# Patient Record
Sex: FEMALE
[De-identification: names, ages, dates, MRNs, and addresses within clinical notes are randomized per-mention and may not be internally consistent; named-entity substitution may affect disease eponyms.]

---

## 2023-07-17 ENCOUNTER — NURSE TRIAGE (OUTPATIENT)
Dept: OTHER | Facility: CLINIC | Age: 28
End: 2023-07-17

## 2023-07-18 NOTE — TELEPHONE ENCOUNTER
Location of patient: [place]     Subjective: Caller states \"Left side lump on breast\"     Current Symptoms: Movable lump on breast, red, painful, swollen    Associated Symptoms: irritability , fussiness    Pain Severity: 10/10; burning; constant    Temperature: Unknown temperatures now     What has been tried: Nothing    LMP:  07/15/2023  Pregnant: No    Recommended disposition: Go to ED Now    Care advice provided, patient verbalizes understanding; denies any other questions or concerns. Outcome: Caller agrees to go to local ED.       This triage is a result of a call to the Nurse Disrupted Nurse Line  Reason for Disposition   [1] Breast looks infected (spreading redness, feels hot or painful to touch) AND [2] fever    Protocols used: Breast Symptoms-ADULT-[facility]